# Patient Record
Sex: FEMALE | Race: BLACK OR AFRICAN AMERICAN
[De-identification: names, ages, dates, MRNs, and addresses within clinical notes are randomized per-mention and may not be internally consistent; named-entity substitution may affect disease eponyms.]

---

## 2019-11-23 ENCOUNTER — HOSPITAL ENCOUNTER (EMERGENCY)
Dept: HOSPITAL 62 - ER | Age: 24
Discharge: HOME | End: 2019-11-23
Payer: COMMERCIAL

## 2019-11-23 VITALS — SYSTOLIC BLOOD PRESSURE: 105 MMHG | DIASTOLIC BLOOD PRESSURE: 58 MMHG

## 2019-11-23 DIAGNOSIS — Z3A.01: ICD-10-CM

## 2019-11-23 DIAGNOSIS — Z88.0: ICD-10-CM

## 2019-11-23 DIAGNOSIS — R10.9: ICD-10-CM

## 2019-11-23 DIAGNOSIS — O20.9: Primary | ICD-10-CM

## 2019-11-23 LAB
ADD MANUAL DIFF: NO
ALBUMIN SERPL-MCNC: 4.3 G/DL (ref 3.5–5)
ALP SERPL-CCNC: 87 U/L (ref 38–126)
ANION GAP SERPL CALC-SCNC: 10 MMOL/L (ref 5–19)
APPEARANCE UR: (no result)
APTT PPP: YELLOW S
AST SERPL-CCNC: 23 U/L (ref 14–36)
BASOPHILS # BLD AUTO: 0.1 10^3/UL (ref 0–0.2)
BASOPHILS NFR BLD AUTO: 0.7 % (ref 0–2)
BILIRUB DIRECT SERPL-MCNC: 0 MG/DL (ref 0–0.4)
BILIRUB SERPL-MCNC: 0.3 MG/DL (ref 0.2–1.3)
BILIRUB UR QL STRIP: NEGATIVE
BUN SERPL-MCNC: 8 MG/DL (ref 7–20)
CALCIUM: 9.6 MG/DL (ref 8.4–10.2)
CHLORIDE SERPL-SCNC: 102 MMOL/L (ref 98–107)
CO2 SERPL-SCNC: 26 MMOL/L (ref 22–30)
EOSINOPHIL # BLD AUTO: 0.1 10^3/UL (ref 0–0.6)
EOSINOPHIL NFR BLD AUTO: 1.2 % (ref 0–6)
ERYTHROCYTE [DISTWIDTH] IN BLOOD BY AUTOMATED COUNT: 14.1 % (ref 11.5–14)
GLUCOSE SERPL-MCNC: 90 MG/DL (ref 75–110)
GLUCOSE UR STRIP-MCNC: NEGATIVE MG/DL
HCT VFR BLD CALC: 37.4 % (ref 36–47)
HGB BLD-MCNC: 12.5 G/DL (ref 12–15.5)
KETONES UR STRIP-MCNC: NEGATIVE MG/DL
LYMPHOCYTES # BLD AUTO: 1.9 10^3/UL (ref 0.5–4.7)
LYMPHOCYTES NFR BLD AUTO: 23.2 % (ref 13–45)
MCH RBC QN AUTO: 29.4 PG (ref 27–33.4)
MCHC RBC AUTO-ENTMCNC: 33.3 G/DL (ref 32–36)
MCV RBC AUTO: 88 FL (ref 80–97)
MONOCYTES # BLD AUTO: 0.7 10^3/UL (ref 0.1–1.4)
MONOCYTES NFR BLD AUTO: 9 % (ref 3–13)
NEUTROPHILS # BLD AUTO: 5.4 10^3/UL (ref 1.7–8.2)
NEUTS SEG NFR BLD AUTO: 65.9 % (ref 42–78)
PH UR STRIP: 7 [PH] (ref 5–9)
PLATELET # BLD: 380 10^3/UL (ref 150–450)
POTASSIUM SERPL-SCNC: 3.9 MMOL/L (ref 3.6–5)
PROT SERPL-MCNC: 8.1 G/DL (ref 6.3–8.2)
PROT UR STRIP-MCNC: NEGATIVE MG/DL
RBC # BLD AUTO: 4.24 10^6/UL (ref 3.72–5.28)
SP GR UR STRIP: 1.02
TOTAL CELLS COUNTED % (AUTO): 100 %
UROBILINOGEN UR-MCNC: NEGATIVE MG/DL (ref ?–2)
WBC # BLD AUTO: 8.1 10^3/UL (ref 4–10.5)

## 2019-11-23 PROCEDURE — 76817 TRANSVAGINAL US OBSTETRIC: CPT

## 2019-11-23 PROCEDURE — 80053 COMPREHEN METABOLIC PANEL: CPT

## 2019-11-23 PROCEDURE — 81001 URINALYSIS AUTO W/SCOPE: CPT

## 2019-11-23 PROCEDURE — 85025 COMPLETE CBC W/AUTO DIFF WBC: CPT

## 2019-11-23 PROCEDURE — S0119 ONDANSETRON 4 MG: HCPCS

## 2019-11-23 PROCEDURE — 84702 CHORIONIC GONADOTROPIN TEST: CPT

## 2019-11-23 PROCEDURE — 99284 EMERGENCY DEPT VISIT MOD MDM: CPT

## 2019-11-23 PROCEDURE — 84703 CHORIONIC GONADOTROPIN ASSAY: CPT

## 2019-11-23 PROCEDURE — 36415 COLL VENOUS BLD VENIPUNCTURE: CPT

## 2019-11-23 NOTE — RADIOLOGY REPORT (SQ)
EXAM DESCRIPTION:  U/S OB TRANSVAGINAL W/O DOP



COMPLETED DATE/TIME:  11/23/2019 7:39 pm



REASON FOR STUDY:  vaginal bleeding in pregnancy



COMPARISON:  None.



TECHNIQUE:  Transvaginal static and realtime grayscale images acquired of the pelvis. Additional emperatriz
cted spectral and color Doppler images recorded. All images stored on PACs.

bHCG: Not available.

CLINICAL DATES:  9 weeks, 6 days



LIMITATIONS:  None.



FINDINGS:  FETUS:  Single Living intrauterine pregnancy.

ULTRASOUND EGA: 6 weeks, 3 days

ULTRASOUND ESTIVEN: 7/15/2020

EFW: Not applicable less than 20 weeks.

CRL:  0.6 cm

FHR: Not identified.

UTERUS: No masses. No anomalies.

CERVICAL LENGTH: 2.4 cm   Closed.

RIGHT ADNEXA: Normal ovary with normal vascular flow.

No adnexal free fluid.

No adnexal masses.

LEFT ADNEXA: Ovary not identified due to poor acoustical window.

No adnexal free fluid.

No adnexal masses.

FREE FLUID: None.

OTHER: No other significant finding.



IMPRESSION:  Single intrauterine gestation at sonographic gestational age of 6 weeks, 3 days.  Fetal 
pole is identified without fetal heart rate.  Early pregnancy of uncertain viability.  Recommend foll
ow-up ultrasound examination in 7 to 14 days along with serial beta HCG to ensure continued developme
nt and viability.

Trimester of pregnancy: First trimester - 0 to 13 weeks.



TECHNICAL DOCUMENTATION:  JOB ID:  [id number]

 2011 [employer]- All Rights Reserved                            rev-5/18



Reading location - IP/workstation name: [email or web address]

## 2019-11-23 NOTE — ER DOCUMENT REPORT
ED Medical Screen (RME)





- General


Chief Complaint: Vaginal Bleeding


Stated Complaint: VAGINAL BLEEDING


Time Seen by Provider: 19 18:25


Primary Care Provider: 


SHARYN LOWERY MD [Primary Care Provider] - Follow up as needed


Mode of Arrival: Ambulatory


Information source: Patient


Notes: 





24-year-old female presented to ED for complaint of vaginal bleeding at 8 weeks 

pregnant.  She states she did have an ultrasound already this pregnancy.  This 

is her second pregnancy she did have 1 miscarriage.  She is  2 para 0 she

denies any nausea or vomiting.  She states she is having some cramping she 

states her pain level is a 2 patient denies drinking smoking or use of any 

illicit drugs.  She states she has had 1 previous miscarriage and she has had a 

surgery.











I have greeted and performed a rapid initial assessment of this patient.  A 

comprehensive ED assessment and evaluation of the patient, analysis of test 

results and completion of medical decision making process will be conducted by 

an additional ED providers.





- Related Data


Allergies/Adverse Reactions: 


                                        





Penicillins Allergy (Verified 13 13:21)


   











Past Medical History





- Immunizations


Hx Diphtheria, Pertussis, Tetanus Vaccination: Yes





Doctor's Discharge





- Discharge


Referrals: 


SHARYN LOWERY MD [Primary Care Provider] - Follow up as needed

## 2019-11-23 NOTE — ER DOCUMENT REPORT
ED GI/





- General


Chief Complaint: Vag Bleeding, +preg <12wks


Stated Complaint: VAGINAL BLEEDING


Time Seen by Provider: 11/23/19 18:25


Mode of Arrival: Ambulatory


Notes: 


Patient is a 24-year-old female, G2, P0 at reportedly 9 to 10 weeks gestation by

first trimester ultrasound, that comes emergency department for chief complaint 

of vaginal bleeding and abdominal cramping that started over the past day.  She 

states that the heartbeat was only 90 last time and she is concerned she is 

developing a miscarriage.  She denies back pain, fever, vomiting.  She denies 

any other complaints.





- Related Data


Allergies/Adverse Reactions: 


                                        





Penicillins Allergy (Verified 12/29/13 13:21)


   








Home Medications: prenatal vitamin





Past Medical History





- General


Information source: Patient





- Social History


Smoking Status: Never Smoker


Chew tobacco use (# tins/day): No


Frequency of alcohol use: None


Drug Abuse: None


Family History: Reviewed & Not Pertinent


Patient has suicidal ideation: No


Patient has homicidal ideation: No





- Immunizations


Hx Diphtheria, Pertussis, Tetanus Vaccination: Yes





Review of Systems





- Review of Systems


Constitutional: No symptoms reported


EENT: No symptoms reported


Cardiovascular: No symptoms reported


Respiratory: No symptoms reported


Gastrointestinal: No symptoms reported


Genitourinary: No symptoms reported


Female Genitourinary: See HPI


Musculoskeletal: No symptoms reported


Skin: No symptoms reported


Hematologic/Lymphatic: No symptoms reported


Neurological/Psychological: No symptoms reported





Physical Exam





- Vital signs


Vitals: 


                                        











Temp Pulse Resp BP Pulse Ox


 


 98.5 F   95   20   134/73 H  100 


 


 11/23/19 18:23  11/23/19 18:23  11/23/19 18:23  11/23/19 18:23  11/23/19 18:23














- Notes


Notes: 





GENERAL: Alert, interacts well. No acute distress.


HEAD: Normocephalic, atraumatic.


EYES: Pupils equal, round, and reactive to light. Extraocular movements intact.


ENT: Oral mucosa moist, tongue midline. Oropharynx unremarkable. Airway patent. 


NECK: Full range of motion. Supple. Trachea midline.


LUNGS: Clear to auscultation bilaterally, no wheezes, rales, or rhonchi. No 

respiratory distress.


HEART: Regular rate and rhythm. No murmur


ABDOMEN: Soft, non-tender. Non-distended. 


EXTREMITIES: Moves all 4 extremities spontaneously. No edema, normal radial and 

dorsalis pedis pulses bilaterally. No cyanosis.


BACK: no cervical, thoracic, lumbar midline tenderness. No saddle anesthesia, 

normal distal neurovascular exam. Moves all extremities in full range of motion.


NEUROLOGICAL: Alert and oriented x3. Normal speech. Cranial nerves II through 

XII grossly intact. 


PSYCH: Normal affect, normal mood.


SKIN: Warm, dry, normal turgor. No rashes or lesions noted.





Course





- Re-evaluation


Re-evalutation: 


Ultrasound showing intrauterine pregnancy at 6 weeks 3 days but no visualized 

heartbeat.  This is concerning because several weeks ago patient states she was 

measuring at around 6 weeks.  Patient is also started cramping and bleeding.  

Overall clinical picture is most consistent with developing miscarriage.  

Patient does have close follow-up with OB/GYN.  I provided her with a copy of 

her report and hCG numbers, discussed expectations, treatment at home, follow-

up, and return precautions.  Patient states she has had one miscarriage before 

and she is familiar with the process.  She states appreciation and agreement.  

Stable time of discharge.











- Vital Signs


Vital signs: 


                                        











Temp Pulse Resp BP Pulse Ox


 


 98.1 F   93   14   105/58 L  100 


 


 11/23/19 22:21  11/23/19 22:21  11/23/19 22:21  11/23/19 22:21  11/23/19 22:21














- Laboratory


Result Diagrams: 


                                 11/23/19 18:40





                                 11/23/19 18:40


Laboratory results interpreted by me: 


                                        











  11/23/19 11/23/19 11/23/19





  18:40 18:40 18:40


 


RDW  14.1 H  


 


Serum HCG, Qual   POSITIVE H 


 


Beta HCG, Quant   


 


Leukocyte Esterase Rfl    TRACE H


 


Urine Ascorbic Acid    40 H














  11/23/19





  18:40


 


RDW 


 


Serum HCG, Qual 


 


Beta HCG, Quant  5640.80 H


 


Leukocyte Esterase Rfl 


 


Urine Ascorbic Acid 














Discharge





- Discharge


Clinical Impression: 


 Vaginal bleeding affecting early pregnancy, Abdominal cramping affecting 

pregnancy





Condition: Stable


Disposition: HOME, SELF-CARE


Additional Instructions: 


Your clinical picture is most consistent with a developing miscarriage.  I 

expect that you will have a lot of cramping and bleeding over the next couple of

days and most likely proceed with a miscarriage.  Follow-up closely with your 

OB/GYN on Monday for additional management.  You can take the pain medication 

provided as needed.  See additional instructions below.





Return to the emergency department for any concerning symptoms including severe 

heavy bleeding with dizziness, passing out, or any other concerning or worsening

symptoms.


________________________





Miscarriage Impending





   You have been evaluated for a possible miscarriage. At this time, it appears 

that the fetus has stopped growing. A miscarriage occurs when the fetus is 

abnormal. There is no medicine or treatment to prevent it.


      If bleeding is not severe, and if your pain can be controlled with 

medicine, you could complete the miscarriage at home. If that's not practical, 

or if the miscarriage doesn't progress spontaneously, we will arrange for a D&C 

procedure.


     You should rest in bed. Do not douche or have sex for at least a week, or 

until OK'd by the doctor. If you believe you've passed the fetus, collect it in 

a zip-lock plastic bag.


     Be sure to follow up with your doctor. Call the doctor or return for re-

examination if there is an increase in bleeding or cramping, extreme weakness, 

fainting, fever, or passage of tissue.


Prescriptions: 


Oxycodone HCl/Acetaminophen [Percocet 5-325 mg Tablet] 1 - 2 tab PO TID PRN #[id number] 

tablet


 PRN Reason:

## 2020-08-18 ENCOUNTER — HOSPITAL ENCOUNTER (OUTPATIENT)
Dept: HOSPITAL 62 - LC | Age: 25
Discharge: HOME | End: 2020-08-18
Attending: OBSTETRICS & GYNECOLOGY
Payer: COMMERCIAL

## 2020-08-18 DIAGNOSIS — O36.8130: Primary | ICD-10-CM

## 2020-08-18 DIAGNOSIS — Z3A.37: ICD-10-CM

## 2020-08-18 LAB
APPEARANCE UR: (no result)
APTT PPP: YELLOW S
BARBITURATES UR QL SCN: NEGATIVE
BILIRUB UR QL STRIP: NEGATIVE
GLUCOSE UR STRIP-MCNC: NEGATIVE MG/DL
KETONES UR STRIP-MCNC: NEGATIVE MG/DL
METHADONE UR QL SCN: NEGATIVE
NITRITE UR QL STRIP: NEGATIVE
PCP UR QL SCN: NEGATIVE
PH UR STRIP: 7 [PH] (ref 5–9)
PROT UR STRIP-MCNC: NEGATIVE MG/DL
SP GR UR STRIP: 1.01
URINE AMPHETAMINES SCREEN: NEGATIVE
URINE BENZODIAZEPINES SCREEN: NEGATIVE
URINE COCAINE SCREEN: NEGATIVE
URINE MARIJUANA (THC) SCREEN: NEGATIVE
UROBILINOGEN UR-MCNC: NEGATIVE MG/DL (ref ?–2)

## 2020-08-18 PROCEDURE — 80307 DRUG TEST PRSMV CHEM ANLYZR: CPT

## 2020-08-18 PROCEDURE — 81005 URINALYSIS: CPT

## 2020-08-18 PROCEDURE — 76819 FETAL BIOPHYS PROFIL W/O NST: CPT

## 2020-08-19 NOTE — RADIOLOGY REPORT (SQ)
CLINICAL HISTORY:  reduced fetal movement 



COMPARISON: None.



TECHNIQUE: US FETAL BIOPHYSICAL PROFILE WITHOUT NON STRESS TEST

8/18/2020 12:00 AM CDT



FINDINGS: 



Single intrauterine gestation is present in vertex presentation

with a heart rate of 131 bpm. Largest amniotic fluid pocket

measures 2.8 x 3.1 cm.



IMPRESSION: 



Biophysical profile 8/8.